# Patient Record
Sex: FEMALE | Race: WHITE | NOT HISPANIC OR LATINO | URBAN - METROPOLITAN AREA
[De-identification: names, ages, dates, MRNs, and addresses within clinical notes are randomized per-mention and may not be internally consistent; named-entity substitution may affect disease eponyms.]

---

## 2017-10-04 ENCOUNTER — OUTPATIENT (OUTPATIENT)
Dept: OUTPATIENT SERVICES | Facility: HOSPITAL | Age: 62
LOS: 1 days | Discharge: HOME | End: 2017-10-04

## 2021-07-02 ENCOUNTER — OUTPATIENT (OUTPATIENT)
Dept: OUTPATIENT SERVICES | Facility: HOSPITAL | Age: 66
LOS: 1 days | Discharge: HOME | End: 2021-07-02
Payer: MEDICAID

## 2021-07-02 ENCOUNTER — APPOINTMENT (OUTPATIENT)
Dept: OPHTHALMOLOGY | Facility: CLINIC | Age: 66
End: 2021-07-02

## 2021-07-02 PROBLEM — Z00.00 ENCOUNTER FOR PREVENTIVE HEALTH EXAMINATION: Status: ACTIVE | Noted: 2021-07-02

## 2021-07-02 PROCEDURE — 92201 OPSCPY EXTND RTA DRAW UNI/BI: CPT

## 2021-07-02 PROCEDURE — 92004 COMPRE OPH EXAM NEW PT 1/>: CPT

## 2022-07-13 ENCOUNTER — OUTPATIENT (OUTPATIENT)
Dept: OUTPATIENT SERVICES | Facility: HOSPITAL | Age: 67
LOS: 1 days | Discharge: HOME | End: 2022-07-13

## 2022-07-13 ENCOUNTER — APPOINTMENT (OUTPATIENT)
Dept: OPHTHALMOLOGY | Facility: CLINIC | Age: 67
End: 2022-07-13

## 2022-07-13 DIAGNOSIS — H43.393 OTHER VITREOUS OPACITIES, BILATERAL: ICD-10-CM

## 2022-07-13 PROCEDURE — 92014 COMPRE OPH EXAM EST PT 1/>: CPT

## 2022-11-24 ENCOUNTER — EMERGENCY (EMERGENCY)
Facility: HOSPITAL | Age: 67
LOS: 0 days | Discharge: HOME | End: 2022-11-25
Attending: EMERGENCY MEDICINE | Admitting: EMERGENCY MEDICINE
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
## 2022-11-24 DIAGNOSIS — Z88.1 ALLERGY STATUS TO OTHER ANTIBIOTIC AGENTS STATUS: ICD-10-CM
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
## 2022-11-24 DIAGNOSIS — I10 ESSENTIAL (PRIMARY) HYPERTENSION: ICD-10-CM

## 2022-11-24 DIAGNOSIS — M54.50 LOW BACK PAIN, UNSPECIFIED: ICD-10-CM

## 2022-11-24 PROCEDURE — 99284 EMERGENCY DEPT VISIT MOD MDM: CPT

## 2022-11-25 VITALS
TEMPERATURE: 98 F | WEIGHT: 199.96 LBS | RESPIRATION RATE: 20 BRPM | SYSTOLIC BLOOD PRESSURE: 143 MMHG | DIASTOLIC BLOOD PRESSURE: 78 MMHG | HEART RATE: 80 BPM | OXYGEN SATURATION: 99 %

## 2022-11-25 PROCEDURE — 73502 X-RAY EXAM HIP UNI 2-3 VIEWS: CPT | Mod: 26,LT

## 2022-11-25 RX ORDER — METHOCARBAMOL 500 MG/1
2 TABLET, FILM COATED ORAL
Qty: 30 | Refills: 0
Start: 2022-11-25 | End: 2022-11-29

## 2022-11-25 RX ORDER — KETOROLAC TROMETHAMINE 30 MG/ML
15 SYRINGE (ML) INJECTION ONCE
Refills: 0 | Status: DISCONTINUED | OUTPATIENT
Start: 2022-11-25 | End: 2022-11-25

## 2022-11-25 RX ORDER — MORPHINE SULFATE 50 MG/1
4 CAPSULE, EXTENDED RELEASE ORAL ONCE
Refills: 0 | Status: DISCONTINUED | OUTPATIENT
Start: 2022-11-25 | End: 2022-11-25

## 2022-11-25 RX ORDER — METHOCARBAMOL 500 MG/1
1500 TABLET, FILM COATED ORAL ONCE
Refills: 0 | Status: COMPLETED | OUTPATIENT
Start: 2022-11-25 | End: 2022-11-25

## 2022-11-25 RX ADMIN — Medication 15 MILLIGRAM(S): at 03:09

## 2022-11-25 RX ADMIN — MORPHINE SULFATE 4 MILLIGRAM(S): 50 CAPSULE, EXTENDED RELEASE ORAL at 05:01

## 2022-11-25 RX ADMIN — METHOCARBAMOL 1500 MILLIGRAM(S): 500 TABLET, FILM COATED ORAL at 02:56

## 2022-11-25 NOTE — ED PROVIDER NOTE - PHYSICAL EXAMINATION
CONST: well appearing for age  HEAD:  normocephalic, atraumatic  EYES:  conjunctivae without injection, drainage or discharge  ENMT:  tympanic membranes pearly gray with normal landmarks; nasal mucosa moist; mouth moist without ulcerations or lesions; throat moist without erythema, exudate, ulcerations or lesions  NECK:  supple, no midline tenderness   CARDIAC:  regular rate and rhythm, normal S1 and S2, no murmurs, rubs or gallops  RESP:  respiratory rate and effort appear normal for age; lungs are clear to auscultation bilaterally; no rales or wheezes  ABDOMEN:  soft, nontender, nondistended  BACK: no midline tenderness, + left lumbar paraspinal tenderness  MUSCULOSKELETAL/NEURO: ambulating without difficulty and without pain, normal movement, normal tone  SKIN:  no ecchymoses, normal skin color for age and race, well-perfused; warm and dry

## 2022-11-25 NOTE — ED PROVIDER NOTE - CARE PROVIDER_API CALL
Aletha Ely)  Family Medicine  [address] [place] [id number]  Phone: [phone]  Fax: [phone]  Follow Up Time: 1-3 Days

## 2022-11-25 NOTE — ED PROVIDER NOTE - PATIENT PORTAL LINK FT
You can access the FollowMyHealth Patient Portal offered by [facility] by registering at the following website: [email or web address]. By joining [facility] [employer]’s FollowMyHealth portal, you will also be able to view your health information using other applications (apps) compatible with our system.

## 2022-11-25 NOTE — ED PROVIDER NOTE - ATTENDING CONTRIBUTION TO CARE
I was present for and supervised the key and critical aspects of the procedures performed during the care of the patient. Pt is a 66 y/o female with PMH of HTN presenting for lower back pain x 2 days. Pt reports yesterday morning, she was coming out of bed when she accidentally slipped and landed on her left lower back. Since then, has had pain from left lower back to left buttocks to left upper thigh. Has been ambulating without difficulty. No numbness or tingling. no loss of bladder or bowel control no fevers or chills no history of IVDA use     on exam patient is well appearing nc/at perrla eomi oropharynx clear cta b/l, rrr s1s2 noted abd-soft nt ndbs+ ext from with no focal deficits noted she has ttp to the left gluteal region   she is able to ambualte well pedal pulses 2 += radial pulses 2 +=   A/P-  Patient presents for evaluation of s/p fall from bed more than 1 day prior we obtained pelvic as well as hip xrays patient given im pain medication she is able to ambulate in the department without change in gait I will discharge at this time

## 2022-11-25 NOTE — ED ADULT TRIAGE NOTE - HAVE YOU EVER HAD A SEVERE ALLERGIC REACTION TO SOMETHING OTHER THAN A VACCINE OR INJECTABLE THERAPY SUCH AS FOOD, PET, VENOM, ENVIRONMENTAL OR ORAL MEDICATION? (E.G. REACTIONS REQUIRING TREATMENT WITH EPINEPHRINE OR HOSPITALIZATION)
Problem: Pain:  Goal: Pain level will decrease  Description: Pain level will decrease  Outcome: Completed  Goal: Control of acute pain  Description: Control of acute pain  Outcome: Completed  Goal: Control of chronic pain  Description: Control of chronic pain  Outcome: Completed
No
No

## 2022-11-25 NOTE — ED ADULT TRIAGE NOTE - CHIEF COMPLAINT QUOTE
Patient states she fell getting out of bed yesterday. C/O lower left back pain radiating to groin and left leg pain.

## 2022-11-25 NOTE — ED PROVIDER NOTE - OBJECTIVE STATEMENT
Pt is a 68 y/o female with PMH of HTN presenting for lower back pain x 2 days. Pt reports yesterday morning, she was coming out of bed when she accidentally slipped and landed on her left lower back. Since then, has had pain from left lower back to left buttocks to left upper thigh. Has been ambulating without difficulty. No numbness or tingling.

## 2023-04-05 NOTE — ED ADULT NURSE NOTE - NS ED PATIENT SAFETY CONCERN
Price ChristyCandeJacquie ambulated out of the ED with a steady gait following the review of his discharge instructions accompanied by his wife. All personal belongings taken with him at the time of discharge, and he denies having any questions or concerns at this time.    No

## 2023-05-12 ENCOUNTER — INPATIENT (INPATIENT)
Facility: HOSPITAL | Age: 68
LOS: 0 days | Discharge: HOME | DRG: 310 | End: 2023-05-13
Attending: STUDENT IN AN ORGANIZED HEALTH CARE EDUCATION/TRAINING PROGRAM | Admitting: STUDENT IN AN ORGANIZED HEALTH CARE EDUCATION/TRAINING PROGRAM
Payer: MEDICARE

## 2023-05-12 VITALS
TEMPERATURE: 98 F | RESPIRATION RATE: 20 BRPM | DIASTOLIC BLOOD PRESSURE: 99 MMHG | OXYGEN SATURATION: 97 % | SYSTOLIC BLOOD PRESSURE: 136 MMHG | WEIGHT: 229.94 LBS | HEART RATE: 146 BPM

## 2023-05-12 LAB
BASOPHILS # BLD AUTO: 0.09 K/UL — SIGNIFICANT CHANGE UP (ref 0–0.2)
BASOPHILS NFR BLD AUTO: 0.7 % — SIGNIFICANT CHANGE UP (ref 0–1)
EOSINOPHIL # BLD AUTO: 0.23 K/UL — SIGNIFICANT CHANGE UP (ref 0–0.7)
EOSINOPHIL NFR BLD AUTO: 1.8 % — SIGNIFICANT CHANGE UP (ref 0–8)
HCT VFR BLD CALC: 43.2 % — SIGNIFICANT CHANGE UP (ref 37–47)
HGB BLD-MCNC: 14.6 G/DL — SIGNIFICANT CHANGE UP (ref 12–16)
IMM GRANULOCYTES NFR BLD AUTO: 0.4 % — HIGH (ref 0.1–0.3)
LYMPHOCYTES # BLD AUTO: 1.89 K/UL — SIGNIFICANT CHANGE UP (ref 1.2–3.4)
LYMPHOCYTES # BLD AUTO: 15 % — LOW (ref 20.5–51.1)
MCHC RBC-ENTMCNC: 30.4 PG — SIGNIFICANT CHANGE UP (ref 27–31)
MCHC RBC-ENTMCNC: 33.8 G/DL — SIGNIFICANT CHANGE UP (ref 32–37)
MCV RBC AUTO: 90 FL — SIGNIFICANT CHANGE UP (ref 81–99)
MONOCYTES # BLD AUTO: 0.86 K/UL — HIGH (ref 0.1–0.6)
MONOCYTES NFR BLD AUTO: 6.8 % — SIGNIFICANT CHANGE UP (ref 1.7–9.3)
NEUTROPHILS # BLD AUTO: 9.52 K/UL — HIGH (ref 1.4–6.5)
NEUTROPHILS NFR BLD AUTO: 75.3 % — HIGH (ref 42.2–75.2)
NRBC # BLD: 0 /100 WBCS — SIGNIFICANT CHANGE UP (ref 0–0)
NT-PROBNP SERPL-SCNC: 83 PG/ML — SIGNIFICANT CHANGE UP (ref 0–300)
PLATELET # BLD AUTO: 270 K/UL — SIGNIFICANT CHANGE UP (ref 130–400)
PMV BLD: 11.2 FL — HIGH (ref 7.4–10.4)
RBC # BLD: 4.8 M/UL — SIGNIFICANT CHANGE UP (ref 4.2–5.4)
RBC # FLD: 12.4 % — SIGNIFICANT CHANGE UP (ref 11.5–14.5)
TROPONIN T SERPL-MCNC: <0.01 NG/ML — SIGNIFICANT CHANGE UP
WBC # BLD: 12.64 K/UL — HIGH (ref 4.8–10.8)
WBC # FLD AUTO: 12.64 K/UL — HIGH (ref 4.8–10.8)

## 2023-05-12 PROCEDURE — 93010 ELECTROCARDIOGRAM REPORT: CPT | Mod: 77

## 2023-05-12 PROCEDURE — 99285 EMERGENCY DEPT VISIT HI MDM: CPT

## 2023-05-12 PROCEDURE — 93010 ELECTROCARDIOGRAM REPORT: CPT

## 2023-05-12 PROCEDURE — 71045 X-RAY EXAM CHEST 1 VIEW: CPT | Mod: 26

## 2023-05-12 RX ORDER — APIXABAN 2.5 MG/1
5 TABLET, FILM COATED ORAL ONCE
Refills: 0 | Status: COMPLETED | OUTPATIENT
Start: 2023-05-12 | End: 2023-05-12

## 2023-05-12 NOTE — ED PROVIDER NOTE - PHYSICAL EXAMINATION
GENERAL: NAD   SKIN: warm, dry  CARD: S1, S2 normal; no murmurs, gallops, or rubs. Tachycardic   RESP: LCTAB; No wheezes, rales, rhonchi, or stridor.  ABD: soft, nontender, and nondistended  EXT: No LE TTP or edema bilaterally.  NEURO: Alert, oriented, grossly unremarkable  PSYCH: Cooperative, appropriate.

## 2023-05-12 NOTE — ED PROVIDER NOTE - NS ED ROS FT
Constitutional: No fevers, chills, or malaise.  HEENT: No headache, visual changes   Cardiac:  No chest pain, SOB, leg edema, or leg pain.  Respiratory:  No cough, respiratory distress, or hemoptysis.  GI:  No nausea, vomiting, diarrhea, or abdominal pain.  :  No dysuria, frequency, or urgency.  Neuro:  No dizziness, LOC, paralysis, or N/T.

## 2023-05-12 NOTE — ED ADULT NURSE NOTE - ED CARDIAC RATE
Dear Judy Spann,    Your symptoms show that you may have coronavirus (COVID-19). This illness can cause fever, cough and trouble breathing. Many people get a mild case and get better on their own. Some people can get very sick.    Will I be tested for COVID-19?  We would like to test you for Covid-19 virus. I have placed orders for this test.     To schedule: go to your [employer] home page and scroll down to the section that says  You have an appointment that needs to be scheduled  and click the large green button that says  Schedule Now  and follow the steps to find the next available openings.    If you are unable to complete these [employer] scheduling steps, please call [phone] [employer] - About COVID-19:   [email or web address]    CDC - What to Do If You're Sick:   www.cdc.gov/coronavirus/2019-ncov/about/steps-when-sick.html    October 12, 2021  RE:  Judy Spann                                                                                                                  [address]  [place] [address]      To whom it may concern:    I evaluated Judy Spann on October 12, 2021. Judy Spann Nargis Mclean

## 2023-05-12 NOTE — ED PROVIDER NOTE - PROGRESS NOTE DETAILS
[id number] score=3, will give eliquis Rocio: pt now back in NSR, [id number] score=3, will give eliquis

## 2023-05-12 NOTE — ED PROVIDER NOTE - CLINICAL SUMMARY MEDICAL DECISION MAKING FREE TEXT BOX
Patient presents with palpitations found to be in new onset a fib with rvr. labs, ekg, cxr done. Reverted back to sinus rhythm. Admitted for further management.

## 2023-05-12 NOTE — ED ADULT NURSE NOTE - NSFALLUNIVINTERV_ED_ALL_ED
Bed/Stretcher in lowest position, wheels locked, appropriate side rails in place/Call bell, personal items and telephone in reach/Instruct patient to call for assistance before getting out of bed/chair/stretcher/Non-slip footwear applied when patient is off stretcher/Gordon to call system/Physically safe environment - no spills, clutter or unnecessary equipment/Purposeful proactive rounding/Room/bathroom lighting operational, light cord in reach

## 2023-05-12 NOTE — ED ADULT TRIAGE NOTE - CHIEF COMPLAINT QUOTE
pt states she had an episode of chest palpitations that started about an hour ago assocated with chest pain.

## 2023-05-12 NOTE — ED PROVIDER NOTE - ATTENDING CONTRIBUTION TO CARE
68 yo M pmh of HTN presents with palpitations. States that tonight she states that her heart started to race which didn't go away. no shortness of breath, no chest pain or pressure. No similar episodes in the past. no fevers or recent illness. does not follow with a cardiologist. no hx of smoking, no leg swelling or pain. no recent travel or surgeries.     CONSTITUTIONAL: Well-developed; well-nourished; in no acute distress.   SKIN: warm, dry  HEAD: Normocephalic; atraumatic.  EYES: PERRL, EOMI, no conjunctival erythema  ENT: No nasal discharge; airway clear.  NECK: Supple; non tender.  CARD: S1, S2 normal;  Regular rate and rhythm.   RESP: No wheezes, rales or rhonchi.  ABD: soft non tender, non distended, no rebound or guarding  EXT: Normal ROM.  5/5 strength in all 4 extremities   LYMPH: No acute cervical adenopathy.  NEURO: Alert, oriented, grossly unremarkable. neurovascularly intact  PSYCH: Cooperative, appropriate.

## 2023-05-13 ENCOUNTER — TRANSCRIPTION ENCOUNTER (OUTPATIENT)
Age: 68
End: 2023-05-13

## 2023-05-13 VITALS
HEART RATE: 74 BPM | SYSTOLIC BLOOD PRESSURE: 119 MMHG | OXYGEN SATURATION: 98 % | TEMPERATURE: 98 F | DIASTOLIC BLOOD PRESSURE: 58 MMHG | RESPIRATION RATE: 18 BRPM

## 2023-05-13 DIAGNOSIS — I48.0 PAROXYSMAL ATRIAL FIBRILLATION: ICD-10-CM

## 2023-05-13 LAB
ALBUMIN SERPL ELPH-MCNC: 4 G/DL — SIGNIFICANT CHANGE UP (ref 3.5–5.2)
ALP SERPL-CCNC: 89 U/L — SIGNIFICANT CHANGE UP (ref 30–115)
ALT FLD-CCNC: 16 U/L — SIGNIFICANT CHANGE UP (ref 0–41)
ANION GAP SERPL CALC-SCNC: 14 MMOL/L — SIGNIFICANT CHANGE UP (ref 7–14)
AST SERPL-CCNC: 20 U/L — SIGNIFICANT CHANGE UP (ref 0–41)
BILIRUB SERPL-MCNC: 0.3 MG/DL — SIGNIFICANT CHANGE UP (ref 0.2–1.2)
BUN SERPL-MCNC: 15 MG/DL — SIGNIFICANT CHANGE UP (ref 10–20)
CALCIUM SERPL-MCNC: 9.6 MG/DL — SIGNIFICANT CHANGE UP (ref 8.4–10.5)
CHLORIDE SERPL-SCNC: 105 MMOL/L — SIGNIFICANT CHANGE UP (ref 98–110)
CO2 SERPL-SCNC: 21 MMOL/L — SIGNIFICANT CHANGE UP (ref 17–32)
CREAT SERPL-MCNC: 0.8 MG/DL — SIGNIFICANT CHANGE UP (ref 0.7–1.5)
EGFR: 81 ML/MIN/1.73M2 — SIGNIFICANT CHANGE UP
GLUCOSE SERPL-MCNC: 145 MG/DL — HIGH (ref 70–99)
MAGNESIUM SERPL-MCNC: 2 MG/DL — SIGNIFICANT CHANGE UP (ref 1.8–2.4)
POTASSIUM SERPL-MCNC: 4 MMOL/L — SIGNIFICANT CHANGE UP (ref 3.5–5)
POTASSIUM SERPL-SCNC: 4 MMOL/L — SIGNIFICANT CHANGE UP (ref 3.5–5)
PROT SERPL-MCNC: 7.3 G/DL — SIGNIFICANT CHANGE UP (ref 6–8)
SODIUM SERPL-SCNC: 140 MMOL/L — SIGNIFICANT CHANGE UP (ref 135–146)
T3 SERPL-MCNC: 120 NG/DL — SIGNIFICANT CHANGE UP (ref 80–200)
T4 AB SER-ACNC: 9.3 UG/DL — SIGNIFICANT CHANGE UP (ref 4.6–12)
TSH SERPL-MCNC: 0.83 UIU/ML — SIGNIFICANT CHANGE UP (ref 0.27–4.2)

## 2023-05-13 PROCEDURE — 84480 ASSAY TRIIODOTHYRONINE (T3): CPT

## 2023-05-13 PROCEDURE — 84436 ASSAY OF TOTAL THYROXINE: CPT

## 2023-05-13 PROCEDURE — 84443 ASSAY THYROID STIM HORMONE: CPT

## 2023-05-13 PROCEDURE — 93005 ELECTROCARDIOGRAM TRACING: CPT

## 2023-05-13 PROCEDURE — 99223 1ST HOSP IP/OBS HIGH 75: CPT

## 2023-05-13 PROCEDURE — 93306 TTE W/DOPPLER COMPLETE: CPT | Mod: 26

## 2023-05-13 PROCEDURE — 93010 ELECTROCARDIOGRAM REPORT: CPT

## 2023-05-13 PROCEDURE — 93306 TTE W/DOPPLER COMPLETE: CPT

## 2023-05-13 PROCEDURE — 36415 COLL VENOUS BLD VENIPUNCTURE: CPT

## 2023-05-13 RX ORDER — APIXABAN 2.5 MG/1
5 TABLET, FILM COATED ORAL EVERY 12 HOURS
Refills: 0 | Status: DISCONTINUED | OUTPATIENT
Start: 2023-05-13 | End: 2023-05-13

## 2023-05-13 RX ORDER — METOPROLOL TARTRATE 50 MG
25 TABLET ORAL
Refills: 0 | Status: DISCONTINUED | OUTPATIENT
Start: 2023-05-13 | End: 2023-05-13

## 2023-05-13 RX ORDER — LOSARTAN POTASSIUM 100 MG/1
50 TABLET, FILM COATED ORAL ONCE
Refills: 0 | Status: COMPLETED | OUTPATIENT
Start: 2023-05-13 | End: 2023-05-13

## 2023-05-13 RX ORDER — APIXABAN 2.5 MG/1
1 TABLET, FILM COATED ORAL
Qty: 60 | Refills: 0
Start: 2023-05-13 | End: 2023-06-11

## 2023-05-13 RX ORDER — LOSARTAN POTASSIUM 100 MG/1
1 TABLET, FILM COATED ORAL
Qty: 0 | Refills: 0 | DISCHARGE

## 2023-05-13 RX ORDER — METOPROLOL TARTRATE 50 MG
12.5 TABLET ORAL
Refills: 0 | Status: DISCONTINUED | OUTPATIENT
Start: 2023-05-13 | End: 2023-05-13

## 2023-05-13 RX ORDER — ACETAMINOPHEN 500 MG
650 TABLET ORAL EVERY 6 HOURS
Refills: 0 | Status: DISCONTINUED | OUTPATIENT
Start: 2023-05-13 | End: 2023-05-13

## 2023-05-13 RX ORDER — METOPROLOL TARTRATE 50 MG
0.5 TABLET ORAL
Qty: 30 | Refills: 0
Start: 2023-05-13 | End: 2023-06-11

## 2023-05-13 RX ORDER — LOSARTAN POTASSIUM 100 MG/1
50 TABLET, FILM COATED ORAL DAILY
Refills: 0 | Status: DISCONTINUED | OUTPATIENT
Start: 2023-05-13 | End: 2023-05-13

## 2023-05-13 RX ADMIN — Medication 12.5 MILLIGRAM(S): at 06:39

## 2023-05-13 RX ADMIN — LOSARTAN POTASSIUM 50 MILLIGRAM(S): 100 TABLET, FILM COATED ORAL at 00:23

## 2023-05-13 RX ADMIN — APIXABAN 5 MILLIGRAM(S): 2.5 TABLET, FILM COATED ORAL at 00:16

## 2023-05-13 NOTE — H&P ADULT - NSHPPHYSICALEXAM_GEN_ALL_CORE
Vital Signs Last 24 Hrs  T(C): 36.8 (12 May 2023 22:50), Max: 36.8 (12 May 2023 22:50)  T(F): 98.2 (12 May 2023 22:50), Max: 98.2 (12 May 2023 22:50)  HR: 75 (13 May 2023 01:06) (75 - 146)  BP: 128/70 (13 May 2023 01:06) (126/68 - 136/99)  BP(mean): --  RR: 19 (13 May 2023 01:06) (19 - 20)  SpO2: 97% (13 May 2023 01:06) (97% - 97%)    Parameters below as of 13 May 2023 01:06  Patient On (Oxygen Delivery Method): room air    PHYSICAL EXAM  GENERAL: NAD  HEAD:  NCAT, EOMI, MMM  NECK: Supple, Nontender  NERVOUS SYSTEM: AAOx3, NFD   CHEST/LUNG: + BS b/l  HEART: +s1s2 RRR  ABDOMEN: soft, NT/ND  EXTREMITIES: pp no edema  SKIN: No rashes or lesions Vital Signs Last 24 Hrs  T(C): 36.8 (12 May 2023 22:50), Max: 36.8 (12 May 2023 22:50)  T(F): 98.2 (12 May 2023 22:50), Max: 98.2 (12 May 2023 22:50)  HR: 75 (13 May 2023 01:06) (75 - 146)  BP: 128/70 (13 May 2023 01:06) (126/68 - 136/99)  BP(mean): --  RR: 19 (13 May 2023 01:06) (19 - 20)  SpO2: 97% (13 May 2023 01:06) (97% - 97%)    Parameters below as of 13 May 2023 01:06  Patient On (Oxygen Delivery Method): room air    PHYSICAL EXAM  GENERAL: NAD  HEAD:  NCAT, EOMI, MMM  NECK: Supple, Nontender  NERVOUS SYSTEM: AAOx3, NFD   CHEST/LUNG: + BS b/l  HEART: +s1s2 RRR  ABDOMEN: soft, NT/ND  EXTREMITIES: pp, no edema  SKIN: No rashes or lesions

## 2023-05-13 NOTE — CONSULT NOTE ADULT - SUBJECTIVE AND OBJECTIVE BOX
Patient is a 67y old  Female who presents with a chief complaint of New Onset Afib (13 May 2023 11:30)        HPI:  68yo F w h/o HTN presents for evaluation of racing heart. Patient says she was running up the stairs really fast while home and immediately felt her heart racing with chest palpitations, stating it felt as if she was having a panic attack . Patient also endorses having more stress recently. At time of exam patient with no complaints; ROS otherwise negative    In ED T 98.2, /99, , RR 20, SpO2 97% on RA; Labs significant for WBC 12.64; EKG showing Afib w RVR.    Patient admitted for further evaluation. (13 May 2023 01:17)      Electrophysiology:  67y Female with h/o HTN, developed palpitations last night after running up the stairs, fitbit showed HR 140s and irregular. Patient presented to ED, was found to be in AFib. Was started on Metoprolol and Eliquis for stroke prevention. Converted to NSR.  Echo is done results pending  Patient denies prior similar episodes, denies recent travels, illnesses, new medications including OTC.       REVIEW OF SYSTEMS    x[ ] A ten-point review of systems was otherwise negative except as noted.  [ ] Due to altered mental status/intubation, subjective information were not able to be obtained from the patient. History was obtained, to the extent possible, from review of the chart and collateral sources of information.      PAST MEDICAL & SURGICAL HISTORY:  Hypertension          Home Medications:  losartan 50 mg oral tablet: 1 tab(s) orally (13 May 2023 01:09)      Allergies:  Biaxin (Unknown)      FAMILY HISTORY: not contributory       SOCIAL HISTORY: denies tobacco / ETOH / illicit drug use     CIGARETTES:  ALCOHOL: social  MARIJUANA:  ILLICIT DRUGS:        PREVIOUS DIAGNOSTIC TESTING:      ECHO  FINDINGS:    STRESS  FINDINGS:    CATHETERIZATION  FINDINGS:    ELECTROPHYSIOLOGY STUDY  FINDINGS:    CAROTID ULTRASOUND:  FINDINGS    VENOUS DUPLEX SCAN:  FINDINGS:    CHEST CT PULMONARY ANGIO with IV Contrast:  FINDINGS:      MEDICATIONS  (STANDING):  apixaban 5 milliGRAM(s) Oral every 12 hours  losartan 50 milliGRAM(s) Oral daily  metoprolol tartrate 12.5 milliGRAM(s) Oral two times a day    MEDICATIONS  (PRN):  acetaminophen     Tablet .. 650 milliGRAM(s) Oral every 6 hours PRN Temp greater or equal to 38C (100.4F), Mild Pain (1 - 3), Moderate Pain (4 - 6)      Vital Signs Last 24 Hrs  T(C): 36.6 (13 May 2023 07:30), Max: 36.8 (12 May 2023 22:50)  T(F): 97.9 (13 May 2023 07:30), Max: 98.2 (12 May 2023 22:50)  HR: 68 (13 May 2023 07:30) (68 - 146)  BP: 133/63 (13 May 2023 07:30) (125/65 - 136/99)  BP(mean): 90 (13 May 2023 07:30) (90 - 90)  RR: 18 (13 May 2023 07:30) (18 - 20)  SpO2: 98% (13 May 2023 07:30) (97% - 98%)    Parameters below as of 13 May 2023 07:30  Patient On (Oxygen Delivery Method): room air        PHYSICAL EXAM:    GENERAL: In no apparent distress, well nourished, and hydrated.  HEAD:  Atraumatic, Normocephalic  EYES: EOMI, PERRLA, conjunctiva and sclera clear  NECK: Supple and normal thyroid.  No JVD or carotid bruit.  Carotid pulse is 2+ bilaterally.  HEART: Regular rate and rhythm; No murmurs, rubs, or gallops.  PULMONARY: Clear to auscultation and perfusion.  No rales, wheezing, or rhonchi bilaterally.  ABDOMEN: Soft, Nontender, Nondistended; Bowel sounds present  EXTREMITIES:  2+ Peripheral Pulses, No clubbing, cyanosis, or edema  NEUROLOGICAL: Grossly nonfocal    I&O's Detail    Daily     Daily     INTERPRETATION OF TELEMETRY:    ECG:        LABS:                        14.6   12.64 )-----------( 270      ( 12 May 2023 23:08 )             43.2     05-12    140  |  105  |  15  ----------------------------<  145<H>  4.0   |  21  |  0.8    Ca    9.6      12 May 2023 23:08  Mg     2.0     05-12    TPro  7.3  /  Alb  4.0  /  TBili  0.3  /  DBili  x   /  AST  20  /  ALT  16  /  AlkPhos  89  05-12    CARDIAC MARKERS ( 12 May 2023 23:08 )  x     / <0.01 ng/mL / x     / x     / x              BNP            RADIOLOGY & ADDITIONAL STUDIES:

## 2023-05-13 NOTE — DISCHARGE NOTE PROVIDER - CARE PROVIDER_API CALL
Phoenix Flowers; AUBREE)  Cardiology[place] [address]  Phone: [phone]  Fax: [phone]  Follow Up Time: 2 weeks    [place] [address]  Phone: [phone]  Fax: [phone]  Follow Up Time: 2 weeks

## 2023-05-13 NOTE — DISCHARGE NOTE PROVIDER - NSDCCPCAREPLAN_GEN_ALL_CORE_FT
PRINCIPAL DISCHARGE DIAGNOSIS  Diagnosis: Paroxysmal atrial fibrillation  Assessment and Plan of Treatment: Atrial Fibrillation  Atrial fibrillation is a type of irregular heartbeat (arrhythmia) where the heart quivers continuously in a chaotic pattern that makes the heart unable to pump blood normally. This can increase the risk for stroke, heart failure, and other heart-related conditions. Atrial fibrillation can be caused by a variety of conditions and may be temporary, intermittent, or permanent. Symptoms include feeling that your heart is beating rapidly or irregularly, chest discomfort, shortness of breath, or dizziness/lightheadedness that may be worse with exertion. Treatment is varied but may involve medication or electrical shock (cardioversion).  SEEK IMMEDIATE MEDICAL CARE IF YOU HAVE ANY OF THE FOLLOWING SYMPTOMS: chest pain, shortness of breath, abdominal pain, sweating, vomiting, blood in vomit/bowel movements/urine, dizziness/lightheadedness, weakness or numbness to face/arm/leg, trouble speaking or understanding, facial droop.

## 2023-05-13 NOTE — H&P ADULT - HISTORY OF PRESENT ILLNESS
68yo F w h/o HTN presents for chest pain and chest palpitations. Patient endorses having more stress recently, and after exerting herself tonight ( 1-2 hours prior to presentation) felt palpitations, associated ache in inter-scapular area of back.     In ED T 98.2, /99, , RR 20, SpO2 97% on RA; Labs significant for WBC 12.64; EKG showing Afib w RVR.    Patient admitted for further evaluation. 66yo F w h/o HTN presents for evaluation of racing heart. Patient says she was running up the stairs really fast while home and immediately felt her heart racing with chest palpitations, stating it felt as if she was having a panic attack . Patient also endorses having more stress recently. At time of exam patient with no complaints; ROS otherwise negative    In ED T 98.2, /99, , RR 20, SpO2 97% on RA; Labs significant for WBC 12.64; EKG showing Afib w RVR.    Patient admitted for further evaluation. no

## 2023-05-13 NOTE — DISCHARGE NOTE PROVIDER - ATTENDING DISCHARGE PHYSICAL EXAMINATION:
Vital Signs Last 24 Hrs  T(F): 97.9 (13 May 2023 07:30), Max: 98.2 (12 May 2023 22:50)  HR: 68 (13 May 2023 07:30) (68 - 146)  BP: 133/63 (13 May 2023 07:30) (125/65 - 136/99)  RR: 18 (13 May 2023 07:30) (18 - 20)  SpO2: 98% (13 May 2023 07:30) (97% - 98%)    PHYSICAL EXAM:  GENERAL: NAD, well-groomed, well-developed  HEAD:  Atraumatic, Normocephalic  EYES: EOMI, conjunctiva and sclera clear  ENMT: Moist mucous membranes, Good dentition, no thrush  NECK: Supple, No JVD  CHEST/LUNG: Clear to auscultation bilaterally, good air entry, non-labored breathing  HEART: RRR; S1/S2, No murmur  ABDOMEN: Soft, Nontender, Nondistended; Bowel sounds present  VASCULAR: Normal pulses, Normal capillary refill  EXTREMITIES: No calf tenderness, No cyanosis, No edema  LYMPH: Normal; No lymphadenopathy noted  SKIN: Warm, Intact  PSYCH: Normal mood, Normal affect  NERVOUS SYSTEM:  A/O x3, Good concentration; CN 2-12 intact, No focal deficits

## 2023-05-13 NOTE — DISCHARGE NOTE NURSING/CASE MANAGEMENT/SOCIAL WORK - NSDCPEFALRISK_GEN_ALL_CORE
For information on Fall & Injury Prevention, visit: [email or web address] OR  [email or web address] OR  https://www.cdc.gov/steadi/patient.html

## 2023-05-13 NOTE — DISCHARGE NOTE PROVIDER - HOSPITAL COURSE
66yo F w h/o HTN presents for evaluation of racing heart. Patient says she was running up the stairs really fast while home and immediately felt her heart racing with chest palpitations, stating it felt as if she was having a panic attack . Patient also endorses having more stress recently. At time of exam patient with no complaints; ROS otherwise negative    In ED T 98.2, /99, , RR 20, SpO2 97% on RA; Labs significant for WBC 12.64; EKG showing Afib w RVR.    Patient admitted for further evaluation.    A.fib RVR   - EKG: A.fib. No significant ST/T wave changes.   - CHADS VASc 3 --> start on eliquis.   - start on PO lopressor 12.5mg BID for now.   - check TTE, TSH  - check UA (pt complains of urinary frequency). Treat if +.   - EP consult.    HTN - on losartan    DVT ppx: eliquis  GI ppx:  PPI  Diet: DASH diet   Activity: as tolerated.      68yo F w h/o HTN presents for evaluation of racing heart. Patient says she was running up the stairs really fast while home and immediately felt her heart racing with chest palpitations, stating it felt as if she was having a panic attack . Patient also endorses having more stress recently. At time of exam patient with no complaints; ROS otherwise negative    In ED T 98.2, /99, , RR 20, SpO2 97% on RA; Labs significant for WBC 12.64; EKG showing Afib w RVR.    Patient admitted for further evaluation.    A.fib RVR   - EKG: A.fib. No significant ST/T wave changes.   - CHADS VASc 3 --> start on eliquis.   - lopressor 12.5mg BID  - EP consult - recommendations noted     HTN - on losartan    Hospital course: Pt was stable following the admission. Her rhythm remained in sinus during the course. She was hence discharged with recommednations.    66yo F w h/o HTN presents for evaluation of racing heart. Patient says she was running up the stairs really fast while home and immediately felt her heart racing with chest palpitations, stating it felt as if she was having a panic attack . Patient also endorses having more stress recently. At time of exam patient with no complaints; ROS otherwise negative    In ED T 98.2, /99, , RR 20, SpO2 97% on RA; Labs significant for WBC 12.64; EKG showing Afib w RVR.    Patient admitted for further evaluation.    A.fib RVR   - EKG: A.fib. No significant ST/T wave changes.   - CHADS VASc 3 --> start on eliquis.   - lopressor 12.5mg BID  - EP consult - recommendations noted   - TTE done EF 58% with no significant structural problems     HTN - on losartan    Hospital course: Pt was stable following the admission. Her rhythm remained in sinus during the course. She was hence discharged with recommendations and outpatient follow up

## 2023-05-13 NOTE — DISCHARGE NOTE PROVIDER - NSDCMRMEDTOKEN_GEN_ALL_CORE_FT
apixaban 5 mg oral tablet: 1 tab(s) orally every 12 hours  losartan 50 mg oral tablet: 1 tab(s) orally   apixaban 5 mg oral tablet: 1 tab(s) orally every 12 hours  losartan 50 mg oral tablet: 1 tab(s) orally once a day  metoprolol tartrate 25 mg oral tablet: 0.5 tab(s) orally 2 times a day

## 2023-05-13 NOTE — H&P ADULT - ASSESSMENT
68yo F w h/o HTN presents for chest pain and chest palpitations found to be in Afib w RVR.    #New Onset Afib w RVR - SIRS criteria met on admission ( hr>90, rr>20, wbc>12) - currently back in NSR  #HTN  - p/f/ chest pains/palpitations,  - ekg showed new onset afib  - eliquis started in ed as CHADSVASC 3 > continue along w low dose bb  - check echo, trend torps, f/u thyroid panel, d-dimer, procal, bcx   - cont losartan for htn  - EP eval    DVT ppx: lovenox  GI ppx: ppi  Activity: IAT  Diet: DASH 68yo F w h/o HTN presents for chest pain and chest palpitations found to be in Afib w RVR.    #New Onset Afib w RVR - SIRS criteria met on admission ( hr>90, rr>20, wbc>12) - currently back in NSR  - p/f/ chest pains/palpitations,  - ekg showed new onset afib  - eliquis started in ed as CHADSVASC 3 > continue along w low dose bb  - check echo, trend torps, f/u thyroid panel, d-dimer, procal, bcx   - EP/Cardio eval    #HTN  - cont home losartan    DVT ppx: lovenox  GI ppx: ppi  Activity: IAT  Diet: DASH

## 2023-05-13 NOTE — H&P ADULT - NSHPLABSRESULTS_GEN_ALL_CORE
Labs:                        14.6   12.64 )-----------( 270      ( 12 May 2023 23:08 )             43.2     140  |  105  |  15  ----------------------------<  145<H>  4.0   |  21  |  0.8    Ca    9.6      12 May 2023 23:08  Mg     2.0     05-12    TPro  7.3  /  Alb  4.0  /  TBili  0.3  /  DBili  x   /  AST  20  /  ALT  16  /  AlkPhos  89  05-12    Troponin T, Serum: <0.01 ng/mL (05-12-23 @ 23:08)

## 2023-05-13 NOTE — DISCHARGE NOTE NURSING/CASE MANAGEMENT/SOCIAL WORK - PATIENT PORTAL LINK FT
You can access the FollowMyHealth Patient Portal offered by [facility] by registering at the following website: [email or web address]. By joining [facility]’s FollowMyHealth portal, you will also be able to view your health information using other applications (apps) compatible with our system.

## 2023-05-13 NOTE — DISCHARGE NOTE PROVIDER - CARE PROVIDERS DIRECT ADDRESSES
[email or web address]

## 2023-05-13 NOTE — DISCHARGE NOTE PROVIDER - PROVIDER TOKENS
PROVIDER:[TOKEN:[[id number]],FOLLOWUP:[2 weeks]],PROVIDER:[TOKEN:[[id number]],FOLLOWUP:[2 weeks]]

## 2023-05-13 NOTE — H&P ADULT - ATTENDING COMMENTS
Patient seen and examined at bedside independently of the residents. I read the resident's note and agree with the plan with the additions and corrections as noted below. My note supersedes the resident's note.     REVIEW OF SYSTEMS:  CONSTITUTIONAL: No weakness, fevers or chills  EYES/ENT: No visual changes;  No vertigo or throat pain   NECK: No pain or stiffness  RESPIRATORY: No cough, wheezing, hemoptysis; No shortness of breath  CARDIOVASCULAR: No chest pain. + palpitation.   GASTROINTESTINAL: No abdominal or epigastric pain. No nausea, vomiting, or hematemesis; No diarrhea or constipation. No melena or hematochezia.  GENITOURINARY: No dysuria, frequency or hematuria  NEUROLOGICAL: No numbness or weakness  MSK: No pain. No weakness.   SKIN: No itching, rashes.     PMH: HTN     FHx: Reviewed. No fhx of asthma/copd, No fhx of liver and pulmonary disease. No fhx of hematological disorder.     Physical Exam:  GEN: No acute distress. Awake, Alert and oriented x 3.   Head: Atraumatic, Normocephalic.   Eye: PEERLA. No sclera icterus. EOMI.   ENT: Normal oropharynx, no thyromegaly, no mass, no lymphadenopathy.   LUNGS: Clear to auscultation bilaterally. No wheeze/rales/crackles.   HEART: Normal. S1/S2 present. RRR. No murmur/gallops.   ABD: Soft, non-tender, non-distended. Bowel sounds present.   EXT: No pitting edema. No erythema. No tenderness.  Integumentary: No rash, No sore, No petechia.   NEURO: CN III-XII intact. Strength: 5/5 b/l ULE. Sensory intact b/l ULE.     Vital Signs Last 24 Hrs  T(C): 36.8 (12 May 2023 22:50), Max: 36.8 (12 May 2023 22:50)  T(F): 98.2 (12 May 2023 22:50), Max: 98.2 (12 May 2023 22:50)  HR: 71 (13 May 2023 05:10) (71 - 146)  BP: 125/65 (13 May 2023 05:10) (125/65 - 136/99)  BP(mean): --  RR: 19 (13 May 2023 01:06) (19 - 20)  SpO2: 97% (13 May 2023 01:06) (97% - 97%)    Parameters below as of 13 May 2023 01:06  Patient On (Oxygen Delivery Method): room air      Please see the above notes for Labs and radiology.     Assessment and Plan:     66 yo F with hx of HTN presents to ED for palpitation.     A.fib RVR   - EKG: A.fib. No significant ST/T wave changes.   - Troponin neg x 1.   - CHADS VASc 3 --> start on eliquis.   - start on PO lopressor 12.5mg BID for now.   - check TTE, TSH  - check UA (pt complains of urinary frequency). Treat if +.   - EP consult.    HTN - on losartan    DVT ppx: eliquis  GI ppx:  PPI  Diet: DASH diet   Activity: as tolerated.     Date seen by the attendin2023  Total time spent: 75 minutes.

## 2023-05-13 NOTE — CONSULT NOTE ADULT - ASSESSMENT
67y Female with h/o HTN, admitted with palpitations, was found to be in AFib. Was started on Metoprolol and Eliquis for stroke prevention. Converted to NSR.    PAF, new onset, now in NSR  [id number] Score is 3  HTN    con't current management  agree with Metoprolol,   consider adjusting Losartan dose  Eliquis for stroke prevention  follow up Echo results  follow up with Dr Flowers in 1mon  to discuss further treatment options

## 2023-05-14 ENCOUNTER — TRANSCRIPTION ENCOUNTER (OUTPATIENT)
Age: 68
End: 2023-05-14

## 2023-05-15 PROBLEM — I10 ESSENTIAL (PRIMARY) HYPERTENSION: Chronic | Status: ACTIVE | Noted: 2023-05-12

## 2023-05-18 DIAGNOSIS — I10 ESSENTIAL (PRIMARY) HYPERTENSION: ICD-10-CM

## 2023-05-18 DIAGNOSIS — Z73.3 STRESS, NOT ELSEWHERE CLASSIFIED: ICD-10-CM

## 2023-05-18 DIAGNOSIS — I48.0 PAROXYSMAL ATRIAL FIBRILLATION: ICD-10-CM

## 2023-05-18 DIAGNOSIS — Z88.8 ALLERGY STATUS TO OTHER DRUGS, MEDICAMENTS AND BIOLOGICAL SUBSTANCES: ICD-10-CM

## 2023-06-15 ENCOUNTER — APPOINTMENT (OUTPATIENT)
Dept: CARDIOLOGY | Facility: CLINIC | Age: 68
End: 2023-06-15
Payer: MEDICARE

## 2023-06-15 ENCOUNTER — APPOINTMENT (OUTPATIENT)
Dept: ELECTROPHYSIOLOGY | Facility: CLINIC | Age: 68
End: 2023-06-15
Payer: MEDICARE

## 2023-06-15 VITALS
WEIGHT: 234 LBS | SYSTOLIC BLOOD PRESSURE: 150 MMHG | BODY MASS INDEX: 43.06 KG/M2 | DIASTOLIC BLOOD PRESSURE: 90 MMHG | TEMPERATURE: 97.1 F | RESPIRATION RATE: 16 BRPM | HEIGHT: 62 IN | HEART RATE: 67 BPM

## 2023-06-15 PROCEDURE — 93000 ELECTROCARDIOGRAM COMPLETE: CPT

## 2023-06-15 PROCEDURE — ZZZZZ: CPT

## 2023-06-15 PROCEDURE — 99204 OFFICE O/P NEW MOD 45 MIN: CPT | Mod: 25

## 2023-06-25 NOTE — HISTORY OF PRESENT ILLNESS
[FreeTextEntry1] : Patient is a 67 year-old woman with history of hypertension who was admitted to the hospital last month with new onset of paroxysmal atrial fibrillation. Patient complained of palpitations and some shortness of breath and was found to be in AF with RVR. Patient spontaneously converted back to normal sinus rhythm and comes to the office for follow-up. No episodes of AF since discharge from the hospital. \par \par \par \par EKG (06/15/2023): Sinus rhythm at 67 bpm, incomplete RBBB. \par \par Echocardiogram (05/13/2023): Normal ejection fraction, normal left atrium, no major valvular disease.

## 2023-06-25 NOTE — ADDENDUM
[FreeTextEntry1] : ICira assisted in documentation on 06/15/2023   acting as a scribe for Dr. Phoenix Flowers.\par

## 2023-09-21 ENCOUNTER — APPOINTMENT (OUTPATIENT)
Dept: ELECTROPHYSIOLOGY | Facility: CLINIC | Age: 68
End: 2023-09-21
Payer: MEDICARE

## 2023-09-21 VITALS
SYSTOLIC BLOOD PRESSURE: 140 MMHG | HEART RATE: 69 BPM | HEIGHT: 62 IN | BODY MASS INDEX: 53.92 KG/M2 | DIASTOLIC BLOOD PRESSURE: 86 MMHG | WEIGHT: 293 LBS | RESPIRATION RATE: 18 BRPM

## 2023-09-21 PROCEDURE — 99214 OFFICE O/P EST MOD 30 MIN: CPT | Mod: 25

## 2023-09-21 PROCEDURE — 93000 ELECTROCARDIOGRAM COMPLETE: CPT

## 2023-09-21 RX ORDER — LOSARTAN POTASSIUM 50 MG/1
50 TABLET, FILM COATED ORAL DAILY
Qty: 90 | Refills: 3 | Status: ACTIVE | COMMUNITY

## 2024-04-18 ENCOUNTER — APPOINTMENT (OUTPATIENT)
Dept: ELECTROPHYSIOLOGY | Facility: CLINIC | Age: 69
End: 2024-04-18
Payer: MEDICARE

## 2024-04-18 VITALS
HEART RATE: 59 BPM | WEIGHT: 230 LBS | HEIGHT: 62 IN | SYSTOLIC BLOOD PRESSURE: 128 MMHG | BODY MASS INDEX: 42.33 KG/M2 | DIASTOLIC BLOOD PRESSURE: 80 MMHG

## 2024-04-18 DIAGNOSIS — I10 ESSENTIAL (PRIMARY) HYPERTENSION: ICD-10-CM

## 2024-04-18 DIAGNOSIS — I48.91 UNSPECIFIED ATRIAL FIBRILLATION: ICD-10-CM

## 2024-04-18 PROCEDURE — G2211 COMPLEX E/M VISIT ADD ON: CPT | Mod: NC,1L

## 2024-04-18 PROCEDURE — 99214 OFFICE O/P EST MOD 30 MIN: CPT | Mod: 25

## 2024-04-18 PROCEDURE — 93000 ELECTROCARDIOGRAM COMPLETE: CPT

## 2024-04-18 RX ORDER — APIXABAN 5 MG/1
5 TABLET, FILM COATED ORAL
Qty: 180 | Refills: 3 | Status: ACTIVE | COMMUNITY
Start: 2023-06-05 | End: 1900-01-01

## 2024-04-18 RX ORDER — METOPROLOL TARTRATE 25 MG/1
25 TABLET, FILM COATED ORAL TWICE DAILY
Qty: 90 | Refills: 3 | Status: ACTIVE | COMMUNITY
Start: 2023-06-05 | End: 1900-01-01

## 2024-05-27 NOTE — ADDENDUM
[FreeTextEntry1] : Cira BLACKWOOD assisted in documentation on 04/18/2024   acting as a scribe for Dr. Phoenix Flowers.

## 2024-05-27 NOTE — ASSESSMENT
[FreeTextEntry1] : Atrial Fibrillation CHADSVASC of 3  - Continue Eliquis 5 mg Q12, no bleeding.  - Discussed with patient different options which included ablation. Since patient is in normal sinus rhythm and had no further episodes, will continue to monitor at this time. - Patient had no AF on event monitor. Will continue to monitor at this time.    Hypertension  - Patient's pressure is well controlled - Continue Losartan 50 mg once a day.  - Will continue to monitor.

## 2024-05-27 NOTE — HISTORY OF PRESENT ILLNESS
[FreeTextEntry1] : Patient is a 67 year-old woman with history of hypertension who was admitted to the hospital last month with new onset of paroxysmal atrial fibrillation. Patient complained of palpitations and some shortness of breath and was found to be in AF with RVR. Patient spontaneously converted back to normal sinus rhythm and comes to the office for follow-up. No episodes of AF since discharge from the hospital.   Patient is doing very well, no signs of recurrence. No palpitations or chest pain.  Patient comes to the office for routine follow-up. Patient's palpitations have significantly improved. No further episodes of atrial fibrillation.     EKG (04/18/2024): Sinus rhythm at 59 bpm.  EKG (09/21/2023): Sinus rhythm at 69 bpm. EKG (06/15/2023): Sinus rhythm at 67 bpm, incomplete RBBB.  Event monitor (06/2023): No episodes of atrial fibrillation, average heart rate of 66 bpm, range between  bpm. No other arrhythmias found.  Echocardiogram (05/13/2023): Normal ejection fraction, normal left atrium, no major valvular disease.

## 2024-10-01 ENCOUNTER — OUTPATIENT (OUTPATIENT)
Dept: OUTPATIENT SERVICES | Facility: HOSPITAL | Age: 69
LOS: 1 days | End: 2024-10-01
Payer: MEDICARE

## 2024-10-01 DIAGNOSIS — Z12.31 ENCOUNTER FOR SCREENING MAMMOGRAM FOR MALIGNANT NEOPLASM OF BREAST: ICD-10-CM

## 2024-10-01 PROCEDURE — 77067 SCR MAMMO BI INCL CAD: CPT

## 2024-10-01 PROCEDURE — 77063 BREAST TOMOSYNTHESIS BI: CPT | Mod: 26

## 2024-10-01 PROCEDURE — 77063 BREAST TOMOSYNTHESIS BI: CPT

## 2024-10-01 PROCEDURE — 77067 SCR MAMMO BI INCL CAD: CPT | Mod: 26

## 2024-10-02 DIAGNOSIS — Z12.31 ENCOUNTER FOR SCREENING MAMMOGRAM FOR MALIGNANT NEOPLASM OF BREAST: ICD-10-CM

## 2025-01-10 NOTE — ED ADULT TRIAGE NOTE - ARRIVAL FROM
[facility]        NAME: Sharon Patel is a 48 y.o. female  : 1976    MRN: [id number]  DATE: January 10, 2025  TIME: 11:24 AM    Assessment and Plan   Acute pharyngitis, unspecified etiology [J02.9]  1. Acute pharyngitis, unspecified etiology  amoxicillin-clavulanate (AUGMENTIN) 875-125 mg per tablet    Throat culture      2. Sore throat  POCT rapid ANTIGEN strepA        Lab strep negative, will send formal culture and provide with antibiotics in the event that she is culture positive for strep pharyngitis.  Otherwise advised to continue supportive care at home.  Advised her to seek care in ED if symptoms worsen.  All questions answered at bedside, patient was amenable to plan and voiced understanding.    Patient Instructions       Follow up with PCP in 3-5 days.  Proceed to  ER if symptoms worsen.    If tests have been performed at [facility], our office will contact you with results if changes need to be made to the care plan discussed with you at the visit.  You can review your full results on [facility] MyChart.    Chief Complaint     Chief Complaint   Patient presents with    Sore Throat    Cold Like Symptoms     C/o symptoms starting yesterday, son recently had strep.          History of Present Illness       48-year-old female with history of allergic rhinitis presents with a chief complaint of sore throat which began 2 days ago.  Patient states that her son at home was recently sick with strep pharyngitis.  She also endorses associated nasal congestion and dry nonproductive cough.  Denies fever, difficulty breathing, stridor, trismus, drooling, chest pain or shortness of breath.  Additionally denies abdominal pain, nausea vomiting or diarrhea.  Has been performing salt water gargles at home with relief.    Sore Throat   This is a new problem. The current episode started yesterday. The problem has been rapidly worsening. Neither side of throat is experiencing more pain than the other. There has  been no fever. The pain is at a severity of 8/10. The pain is severe. Associated symptoms include congestion, coughing, a plugged ear sensation and swollen glands. Pertinent negatives include no abdominal pain, diarrhea, drooling, ear discharge, ear pain, headaches, hoarse voice, neck pain, shortness of breath, stridor, trouble swallowing or vomiting. She has had exposure to strep.       Review of Systems   Review of Systems   Constitutional:  Negative for activity change, appetite change, chills, diaphoresis, fatigue, fever and unexpected weight change.   HENT:  Positive for congestion, postnasal drip and rhinorrhea. Negative for dental problem, drooling, ear discharge, ear pain, facial swelling, hearing loss, hoarse voice, mouth sores, nosebleeds, sinus pressure, sinus pain, sneezing, sore throat, tinnitus, trouble swallowing and voice change.    Eyes:  Negative for pain and visual disturbance.   Respiratory:  Positive for cough. Negative for apnea, choking, chest tightness, shortness of breath, wheezing and stridor.    Cardiovascular:  Negative for chest pain, palpitations and leg swelling.   Gastrointestinal:  Negative for abdominal distention, abdominal pain, anal bleeding, blood in stool, constipation, diarrhea, nausea, rectal pain and vomiting.   Genitourinary:  Negative for dysuria and hematuria.   Musculoskeletal:  Negative for arthralgias, back pain, gait problem, joint swelling, myalgias, neck pain and neck stiffness.   Skin:  Negative for color change and rash.   Neurological:  Negative for seizures, syncope and headaches.   All other systems reviewed and are negative.        Current Medications       Current Outpatient Medications:     amoxicillin-clavulanate (AUGMENTIN) 875-125 mg per tablet, Take 1 tablet by mouth 2 (two) times a day for 10 days, Disp: 20 tablet, Rfl: 0    albuterol (PROVENTIL HFA,VENTOLIN HFA) 90 mcg/act inhaler, Inhale 2 puffs every 6 (six) hours as needed for wheezing, Disp: , Rfl:      cetirizine (ZyrTEC) 10 mg tablet, , Disp: , Rfl:     cholecalciferol (VITAMIN D3) 1,000 units tablet, Take 1,000 Units by mouth daily, Disp: , Rfl:     ibuprofen (MOTRIN) 200 mg tablet, Take 200-800 mg by mouth every 6 (six) hours as needed for mild pain, Disp: , Rfl:     montelukast (SINGULAIR) 10 mg tablet, TAKE 1 TABLET (10 MG) BY MOUTH DAILY., Disp: , Rfl:     Omega 3-6-9 Fatty Acids (OMEGA 3-6-9 COMPLEX PO), Take by mouth in the morning, Disp: , Rfl:     vitamin B-12 (VITAMIN B-12) 1,000 mcg tablet, Take 1,000 mcg by mouth, Disp: , Rfl:     Zinc 22.5 MG TABS, Take by mouth, Disp: , Rfl:     Current Allergies     Allergies as of 01/10/2025 - Reviewed 01/10/2025   Allergen Reaction Noted    Other Wheezing 09/11/2023            The following portions of the patient's history were reviewed and updated as appropriate: allergies, current medications, past family history, past medical history, past social history, past surgical history and problem list.     Past Medical History:   Diagnosis Date    Allergic     Anesthesia complication     believes awoke during procedure    Excessive and frequent menstruation     Exercise-induced asthma     Polyp of corpus uteri     Post concussion syndrome     doing vision therapy    Seasonal allergies        Past Surgical History:   Procedure Laterality Date    DILATION AND CURETTAGE OF UTERUS      POLYPECTOMY N/A 11/7/2019    Procedure: POLYPECTOMY;  Surgeon: Lilliam Coyle DO;  Location: [facility];  Service: Gynecology    OK HYSTEROSCOPY BX ENDOMETRIUM&/POLYPC W/WO D&C N/A 11/7/2019    Procedure: D&C, HYSTEROSCOPY W/ RESECTION;  Surgeon: Lilliam Coyle DO;  Location: [facility];  Service: Gynecology    TREATMENT FISTULA ANAL      WISDOM TOOTH EXTRACTION         Family History   Problem Relation Age of Onset    No Known Problems Mother     Lymphoma Father 65    Brain cancer Maternal Grandmother 50    No Known Problems Maternal Grandfather     Breast cancer Paternal  "Grandmother 92    Melanoma Paternal Grandfather 86    No Known Problems Brother     No Known Problems Son     No Known Problems Son     No Known Problems Maternal Aunt     No Known Problems Maternal Aunt     Brain cancer Paternal Aunt 70    Breast cancer Paternal Aunt 61         Medications have been verified.        Objective   /64   Pulse 75   Temp (!) 97.1 °F (36.2 °C) (Tympanic)   Resp 18   Ht 5' 4\" (1.626 m)   Wt 73.5 kg (162 lb)   SpO2 98%   BMI 27.81 kg/m²   No LMP recorded.       Physical Exam     Physical Exam  Vitals and nursing note reviewed.   Constitutional:       General: She is not in acute distress.     Appearance: Normal appearance. She is normal weight. She is not ill-appearing, toxic-appearing or diaphoretic.   HENT:      Head: Normocephalic and atraumatic.      Right Ear: Tympanic membrane, ear canal and external ear normal. No drainage, swelling or tenderness. No middle ear effusion. Tympanic membrane is not erythematous.      Left Ear: Tympanic membrane, ear canal and external ear normal. No drainage, swelling or tenderness.  No middle ear effusion. Tympanic membrane is not erythematous.      Nose: Congestion present. No rhinorrhea.      Mouth/Throat:      Mouth: Mucous membranes are moist. No oral lesions.      Pharynx: Oropharyngeal exudate and posterior oropharyngeal erythema present. No pharyngeal swelling or uvula swelling.      Tonsils: Tonsillar exudate present. No tonsillar abscesses. 1+ on the right. 1+ on the left.      Comments: No stridor, trismus, drooling noted  Eyes:      General: No scleral icterus.        Right eye: No discharge.         Left eye: No discharge.      Extraocular Movements: Extraocular movements intact.      Right eye: Normal extraocular motion.      Left eye: Normal extraocular motion.      Conjunctiva/sclera: Conjunctivae normal.      Pupils: Pupils are equal, round, and reactive to light.   Neck:      Thyroid: No thyromegaly.   Cardiovascular:     "  Rate and Rhythm: Normal rate and regular rhythm.      Pulses: Normal pulses.           Carotid pulses are 2+ on the right side and 2+ on the left side.       Radial pulses are 2+ on the right side and 2+ on the left side.      Heart sounds: Normal heart sounds. No murmur heard.     No friction rub. No gallop.   Pulmonary:      Effort: Pulmonary effort is normal. No respiratory distress.      Breath sounds: Normal breath sounds. No stridor. No wheezing, rhonchi or rales.   Chest:      Chest wall: No tenderness.   Abdominal:      General: Abdomen is flat. There is no distension.      Palpations: Abdomen is soft. There is no mass.      Tenderness: There is no abdominal tenderness. There is no right CVA tenderness, left CVA tenderness, guarding or rebound.      Hernia: No hernia is present.   Musculoskeletal:         General: No swelling, tenderness, deformity or signs of injury.      Cervical back: Normal range of motion and neck supple.      Right lower leg: No edema.      Left lower leg: No edema.   Lymphadenopathy:      Cervical: Cervical adenopathy present.   Skin:     General: Skin is warm and dry.      Coloration: Skin is not pale.      Findings: No erythema or rash.   Neurological:      General: No focal deficit present.      Mental Status: She is alert and oriented to person, place, and time.      Cranial Nerves: No cranial nerve deficit.      Sensory: No sensory deficit.      Motor: No weakness.      Coordination: Coordination normal.      Gait: Gait normal.      Deep Tendon Reflexes: Reflexes normal.   Psychiatric:         Mood and Affect: Mood normal.         Behavior: Behavior normal.                      Home

## 2025-01-16 ENCOUNTER — APPOINTMENT (OUTPATIENT)
Dept: ELECTROPHYSIOLOGY | Facility: CLINIC | Age: 70
End: 2025-01-16
Payer: MEDICARE

## 2025-01-16 VITALS
DIASTOLIC BLOOD PRESSURE: 70 MMHG | HEIGHT: 62 IN | BODY MASS INDEX: 41.77 KG/M2 | HEART RATE: 68 BPM | WEIGHT: 227 LBS | SYSTOLIC BLOOD PRESSURE: 140 MMHG

## 2025-01-16 DIAGNOSIS — I48.91 UNSPECIFIED ATRIAL FIBRILLATION: ICD-10-CM

## 2025-01-16 DIAGNOSIS — I10 ESSENTIAL (PRIMARY) HYPERTENSION: ICD-10-CM

## 2025-01-16 PROCEDURE — 99214 OFFICE O/P EST MOD 30 MIN: CPT | Mod: 25

## 2025-01-16 PROCEDURE — 93000 ELECTROCARDIOGRAM COMPLETE: CPT

## 2025-09-04 ENCOUNTER — APPOINTMENT (OUTPATIENT)
Dept: ELECTROPHYSIOLOGY | Facility: CLINIC | Age: 70
End: 2025-09-04